# Patient Record
Sex: MALE | Race: OTHER | HISPANIC OR LATINO | URBAN - METROPOLITAN AREA
[De-identification: names, ages, dates, MRNs, and addresses within clinical notes are randomized per-mention and may not be internally consistent; named-entity substitution may affect disease eponyms.]

---

## 2020-03-10 ENCOUNTER — OUTPATIENT (OUTPATIENT)
Dept: OUTPATIENT SERVICES | Facility: HOSPITAL | Age: 40
LOS: 1 days | End: 2020-03-10
Payer: COMMERCIAL

## 2020-03-10 ENCOUNTER — APPOINTMENT (OUTPATIENT)
Dept: FAMILY MEDICINE | Facility: HOSPITAL | Age: 40
End: 2020-03-10

## 2020-03-10 VITALS
OXYGEN SATURATION: 99 % | HEIGHT: 65.5 IN | BODY MASS INDEX: 31.44 KG/M2 | WEIGHT: 191 LBS | HEART RATE: 73 BPM | DIASTOLIC BLOOD PRESSURE: 78 MMHG | RESPIRATION RATE: 16 BRPM | SYSTOLIC BLOOD PRESSURE: 127 MMHG | TEMPERATURE: 97.9 F

## 2020-03-10 DIAGNOSIS — Z78.9 OTHER SPECIFIED HEALTH STATUS: ICD-10-CM

## 2020-03-10 DIAGNOSIS — Z00.00 ENCOUNTER FOR GENERAL ADULT MEDICAL EXAMINATION WITHOUT ABNORMAL FINDINGS: ICD-10-CM

## 2020-03-10 DIAGNOSIS — D17.9 BENIGN LIPOMATOUS NEOPLASM, UNSPECIFIED: ICD-10-CM

## 2020-03-10 DIAGNOSIS — Z00.00 ENCOUNTER FOR GENERAL ADULT MEDICAL EXAMINATION W/OUT ABNORMAL FINDINGS: ICD-10-CM

## 2020-03-10 PROCEDURE — G0463: CPT

## 2020-03-10 PROCEDURE — 90471 IMMUNIZATION ADMIN: CPT

## 2020-03-10 NOTE — HISTORY OF PRESENT ILLNESS
[FreeTextEntry1] : New pt CPE [de-identified] : 39M w/ no significant PMH presents for new patient CPE. Endorses a 17 year hx of a growth on top of head. Believes it appeared after minor trauma. Nontender, mobile, firm. More prominent after recent haircut. Pt otherwise has no complaints at this time and denies fever/chills, NVD, weight loss/gain, CP, SoB. Works as a .  w/ 2 children, boys 13 and 17.

## 2020-03-10 NOTE — PLAN
[FreeTextEntry1] : #Lipoma\par - General surgery referral for removal\par \par #HM\par - F/U CBC, BMP, lipids, A1c, HIV\par - Weight: goal BMI <25. \par - Physical Activity: Advised pt 150 min/wk aerobic activity recommended\par - Medical Nutritional Therapy: Mediterranean diet recommended. \par - Smoking: non-smoker \par \par #Need for flu vaccine \par - Flu vaccine declined\par \par #Need for Tdap\par - Tdap declined \par \par Case discussed w/ Dr Rosario

## 2020-03-10 NOTE — PHYSICAL EXAM
[EOMI] : extraocular movements intact [No Lymphadenopathy] : no lymphadenopathy [Normal] : normal rate, regular rhythm, normal S1 and S2 and no murmur heard [Soft] : abdomen soft [Non Tender] : non-tender [No Masses] : no abdominal mass palpated [Normal Bowel Sounds] : normal bowel sounds [Grossly Normal Strength/Tone] : grossly normal strength/tone [de-identified] : ~1cm firm, mobile mass rising from crown of head. Nontender, no overlying skin changes.

## 2020-03-10 NOTE — ASSESSMENT
[FreeTextEntry1] : 39 M with PMH of ____ presents for new patient CPE. Pt has no complaints at this time and denies fever/chills, NVD, weight loss/gain, CP, SoB. \par

## 2020-03-10 NOTE — HEALTH RISK ASSESSMENT
[0] : 2) Feeling down, depressed, or hopeless: Not at all (0) [Fully functional (bathing, dressing, toileting, transferring, walking, feeding)] : Fully functional (bathing, dressing, toileting, transferring, walking, feeding) [Fully functional (using the telephone, shopping, preparing meals, housekeeping, doing laundry, using] : Fully functional and needs no help or supervision to perform IADLs (using the telephone, shopping, preparing meals, housekeeping, doing laundry, using transportation, managing medications and managing finances)

## 2020-03-11 DIAGNOSIS — Z11.4 ENCOUNTER FOR SCREENING FOR HUMAN IMMUNODEFICIENCY VIRUS [HIV]: ICD-10-CM

## 2020-03-11 DIAGNOSIS — Z23 ENCOUNTER FOR IMMUNIZATION: ICD-10-CM

## 2020-03-11 DIAGNOSIS — D17.9 BENIGN LIPOMATOUS NEOPLASM, UNSPECIFIED: ICD-10-CM

## 2020-03-13 LAB
ANION GAP SERPL CALC-SCNC: 11 MMOL/L
BASOPHILS # BLD AUTO: 0.03 K/UL
BASOPHILS NFR BLD AUTO: 0.5 %
BUN SERPL-MCNC: 16 MG/DL
CALCIUM SERPL-MCNC: 9.5 MG/DL
CHLORIDE SERPL-SCNC: 104 MMOL/L
CHOLEST SERPL-MCNC: 191 MG/DL
CHOLEST/HDLC SERPL: 5.2 RATIO
CO2 SERPL-SCNC: 25 MMOL/L
CREAT SERPL-MCNC: 1.21 MG/DL
EOSINOPHIL # BLD AUTO: 0.06 K/UL
EOSINOPHIL NFR BLD AUTO: 0.9 %
ESTIMATED AVERAGE GLUCOSE: 103 MG/DL
GLUCOSE SERPL-MCNC: 88 MG/DL
HBA1C MFR BLD HPLC: 5.2 %
HCT VFR BLD CALC: 48.1 %
HDLC SERPL-MCNC: 36 MG/DL
HGB BLD-MCNC: 16 G/DL
HIV1+2 AB SPEC QL IA.RAPID: NONREACTIVE
IMM GRANULOCYTES NFR BLD AUTO: 0.2 %
LDLC SERPL CALC-MCNC: 124 MG/DL
LYMPHOCYTES # BLD AUTO: 2.67 K/UL
LYMPHOCYTES NFR BLD AUTO: 42.2 %
MAN DIFF?: NORMAL
MCHC RBC-ENTMCNC: 29 PG
MCHC RBC-ENTMCNC: 33.3 GM/DL
MCV RBC AUTO: 87.1 FL
MONOCYTES # BLD AUTO: 0.58 K/UL
MONOCYTES NFR BLD AUTO: 9.2 %
NEUTROPHILS # BLD AUTO: 2.97 K/UL
NEUTROPHILS NFR BLD AUTO: 47 %
PLATELET # BLD AUTO: 257 K/UL
POTASSIUM SERPL-SCNC: 4 MMOL/L
RBC # BLD: 5.52 M/UL
RBC # FLD: 13 %
SODIUM SERPL-SCNC: 140 MMOL/L
TRIGL SERPL-MCNC: 153 MG/DL
WBC # FLD AUTO: 6.32 K/UL

## 2020-03-25 ENCOUNTER — APPOINTMENT (OUTPATIENT)
Dept: SURGERY | Facility: HOSPITAL | Age: 40
End: 2020-03-25

## 2020-04-16 NOTE — PLAN
[FreeTextEntry1] : pt ia almost completely without symptoms. we discussed to cont hydration and well balance diet and still cont self quarantine. I will call him on Monday to discuss his returning to work

## 2020-04-20 DIAGNOSIS — Z20.828 CONTACT WITH AND (SUSPECTED) EXPOSURE TO OTHER VIRAL COMMUNICABLE DISEASES: ICD-10-CM

## 2020-04-23 DIAGNOSIS — R63.0 ANOREXIA: ICD-10-CM

## 2020-04-23 DIAGNOSIS — R52 PAIN, UNSPECIFIED: ICD-10-CM

## 2020-04-23 DIAGNOSIS — Z11.4 ENCOUNTER FOR SCREENING FOR HUMAN IMMUNODEFICIENCY VIRUS [HIV]: ICD-10-CM

## 2020-04-23 DIAGNOSIS — Z87.898 PERSONAL HISTORY OF OTHER SPECIFIED CONDITIONS: ICD-10-CM

## 2020-04-23 DIAGNOSIS — Z23 ENCOUNTER FOR IMMUNIZATION: ICD-10-CM

## 2020-04-23 DIAGNOSIS — R68.89 OTHER GENERAL SYMPTOMS AND SIGNS: ICD-10-CM

## 2020-04-23 NOTE — PLAN
[FreeTextEntry1] : pt most probably has COVID-19 infection and most probably from his son, however  in the family has been tested, I advised for personal hygiene, self quarantine, hydration and balance diet including food rich in Vitamin C and Zinc. pt benefit from testing due to his claim of severe SOB and also in lieu of his job in the company and may  spread the disease even being without symptoms. I will call him tomorrow to see how he is doing. nature of disease d/w pt

## 2020-04-23 NOTE — HISTORY OF PRESENT ILLNESS
[Verbal consent obtained from patient] : the patient, [unfilled] [Time Spent: ___ minutes] : I have spent [unfilled] minutes with the patient on the telephone [FreeTextEntry1] : pt called that he and his wife are sick , said first their son who works at [employer] was sick with fever and then he started to have fever fluctuating between between 99 t0 102, never more than that, taking Tylenol for it , I asked him to check his temperature for me during the phone visit and it was 102, he c/o mild chills, moderate cough and Moderate SOB but no HA (resolved), had 1 day of sneezing and runny nose last Wednesday , still has mild sore throat, mildly fatigued with body ache/ back pain, he has no GI symptoms, appetite is mildly decreased but no loss of taste or smell(anosmia) , 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

## 2020-04-29 NOTE — HISTORY OF PRESENT ILLNESS
[Verbal consent obtained from patient] : the patient, [unfilled] [Time Spent: ___ minutes] : I have spent [unfilled] minutes with the patient on the telephone [FreeTextEntry1] : I called pt to see if he is completely from his symptoms as he requested for return to his job. He needs clearance letter. he is not coughing any more as before  since our last conversation and wants to go back to work on Saturday

## 2020-04-29 NOTE — ADDENDUM
[FreeTextEntry1] : pt is compeltly asymptomatic, he may return to work on his full duty. However , he has to wear mask and glove and gown, he stated the job will provide him with needed equipment. I

## 2020-04-29 NOTE — HISTORY OF PRESENT ILLNESS
[Verbal consent obtained from patient] : the patient, [unfilled] [Yes] : yes [No] : no difficulty breathing [None] : none [Stable] : stable [Patient advised to contact office if symptoms progress] : Patient advised to contact office if symptoms progress [FreeTextEntry1] : I called pt as he asked me for job clearance. 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  [TextBox_8] : 14 [Time Spent: ___ minutes] : I have spent [unfilled] minutes with the patient on the telephone

## 2020-04-29 NOTE — PLAN
[FreeTextEntry1] : pt is still coughing but has improved a lot and taking Dayquil and Night quil , advised for hydration. I asked pt to call me on Thursday and I will write the letter for job.well balance diet advised

## 2020-09-23 ENCOUNTER — APPOINTMENT (OUTPATIENT)
Dept: SURGERY | Facility: HOSPITAL | Age: 40
End: 2020-09-23

## 2020-09-23 ENCOUNTER — OUTPATIENT (OUTPATIENT)
Dept: OUTPATIENT SERVICES | Facility: HOSPITAL | Age: 40
LOS: 1 days | End: 2020-09-23
Payer: COMMERCIAL

## 2020-09-23 VITALS
RESPIRATION RATE: 16 BRPM | SYSTOLIC BLOOD PRESSURE: 120 MMHG | HEART RATE: 64 BPM | BODY MASS INDEX: 31.27 KG/M2 | HEIGHT: 65.5 IN | TEMPERATURE: 97.3 F | DIASTOLIC BLOOD PRESSURE: 80 MMHG | WEIGHT: 190 LBS | OXYGEN SATURATION: 100 %

## 2020-09-23 DIAGNOSIS — Z00.00 ENCOUNTER FOR GENERAL ADULT MEDICAL EXAMINATION WITHOUT ABNORMAL FINDINGS: ICD-10-CM

## 2020-09-23 PROCEDURE — G0463: CPT

## 2020-09-25 DIAGNOSIS — R22.9 LOCALIZED SWELLING, MASS AND LUMP, UNSPECIFIED: ICD-10-CM

## 2020-10-07 ENCOUNTER — APPOINTMENT (OUTPATIENT)
Dept: SURGERY | Facility: HOSPITAL | Age: 40
End: 2020-10-07

## 2020-10-07 ENCOUNTER — OUTPATIENT (OUTPATIENT)
Dept: OUTPATIENT SERVICES | Facility: HOSPITAL | Age: 40
LOS: 1 days | End: 2020-10-07
Payer: COMMERCIAL

## 2020-10-07 VITALS
DIASTOLIC BLOOD PRESSURE: 74 MMHG | WEIGHT: 190 LBS | SYSTOLIC BLOOD PRESSURE: 148 MMHG | BODY MASS INDEX: 30.53 KG/M2 | HEART RATE: 62 BPM | RESPIRATION RATE: 16 BRPM | HEIGHT: 66 IN | OXYGEN SATURATION: 99 % | TEMPERATURE: 97.8 F

## 2020-10-07 DIAGNOSIS — Z00.00 ENCOUNTER FOR GENERAL ADULT MEDICAL EXAMINATION WITHOUT ABNORMAL FINDINGS: ICD-10-CM

## 2020-10-07 PROCEDURE — G0463: CPT

## 2020-10-08 DIAGNOSIS — R22.9 LOCALIZED SWELLING, MASS AND LUMP, UNSPECIFIED: ICD-10-CM

## 2020-10-16 LAB — CORE LAB BIOPSY: NORMAL
